# Patient Record
Sex: MALE | Race: WHITE | NOT HISPANIC OR LATINO | URBAN - METROPOLITAN AREA
[De-identification: names, ages, dates, MRNs, and addresses within clinical notes are randomized per-mention and may not be internally consistent; named-entity substitution may affect disease eponyms.]

---

## 2023-11-09 ENCOUNTER — HOSPITAL ENCOUNTER (EMERGENCY)
Facility: HOSPITAL | Age: 23
Discharge: HOME | End: 2023-11-09
Payer: COMMERCIAL

## 2023-11-09 VITALS
SYSTOLIC BLOOD PRESSURE: 131 MMHG | DIASTOLIC BLOOD PRESSURE: 68 MMHG | RESPIRATION RATE: 18 BRPM | WEIGHT: 196.43 LBS | HEART RATE: 87 BPM | HEIGHT: 71 IN | TEMPERATURE: 97.9 F | OXYGEN SATURATION: 98 % | BODY MASS INDEX: 27.5 KG/M2

## 2023-11-09 DIAGNOSIS — S61.012A LACERATION OF LEFT THUMB WITHOUT FOREIGN BODY WITHOUT DAMAGE TO NAIL, INITIAL ENCOUNTER: Primary | ICD-10-CM

## 2023-11-09 DIAGNOSIS — M79.645 PAIN OF FINGER OF LEFT HAND: ICD-10-CM

## 2023-11-09 PROCEDURE — 99285 EMERGENCY DEPT VISIT HI MDM: CPT

## 2023-11-09 PROCEDURE — 99282 EMERGENCY DEPT VISIT SF MDM: CPT

## 2023-11-09 ASSESSMENT — PAIN DESCRIPTION - DESCRIPTORS: DESCRIPTORS: SHARP

## 2023-11-09 ASSESSMENT — PAIN DESCRIPTION - ORIENTATION: ORIENTATION: LEFT

## 2023-11-09 ASSESSMENT — COLUMBIA-SUICIDE SEVERITY RATING SCALE - C-SSRS
2. HAVE YOU ACTUALLY HAD ANY THOUGHTS OF KILLING YOURSELF?: NO
6. HAVE YOU EVER DONE ANYTHING, STARTED TO DO ANYTHING, OR PREPARED TO DO ANYTHING TO END YOUR LIFE?: NO
1. IN THE PAST MONTH, HAVE YOU WISHED YOU WERE DEAD OR WISHED YOU COULD GO TO SLEEP AND NOT WAKE UP?: NO

## 2023-11-09 ASSESSMENT — PAIN DESCRIPTION - LOCATION: LOCATION: HAND

## 2023-11-09 ASSESSMENT — PAIN SCALES - GENERAL: PAINLEVEL_OUTOF10: 2

## 2023-11-09 ASSESSMENT — PAIN DESCRIPTION - ONSET: ONSET: GRADUAL

## 2023-11-09 ASSESSMENT — PAIN - FUNCTIONAL ASSESSMENT: PAIN_FUNCTIONAL_ASSESSMENT: 0-10

## 2023-11-09 ASSESSMENT — PAIN DESCRIPTION - FREQUENCY: FREQUENCY: CONSTANT/CONTINUOUS

## 2023-11-09 ASSESSMENT — PAIN DESCRIPTION - PAIN TYPE: TYPE: ACUTE PAIN

## 2023-11-09 ASSESSMENT — PAIN DESCRIPTION - PROGRESSION: CLINICAL_PROGRESSION: GRADUALLY WORSENING

## 2023-11-09 NOTE — ED NOTES
Patient's wound cleansed and wrapped with xeroform and pressure dressing. Instructions for wound care reviewed with patient and all questions answered to patient's satisfaction.     Siobhan Fernandez RN  11/09/23 5979

## 2023-11-09 NOTE — Clinical Note
Ludwin Vaughan was seen and treated in our emergency department on 11/9/2023.  He may return to work on 11/10/2023.  Must keep wound clean and dry      If you have any questions or concerns, please don't hesitate to call.      Veronica Boucher PA-C

## 2023-11-09 NOTE — ED PROVIDER NOTES
HPI   Chief Complaint   Patient presents with    Finger Laceration     Sliced off tip of finger with knife at work, bleeding controlled,        Patient presents emergency department with complaint of laceration to left thumb that occurred at work.  Patient works at [employer].  He was using a knife to cut an onion when he cut the distal aspect of his thumb.  Patient denies any other injury or complaints.  Last tetanus shot in 2016.       History provided by:  Patient   used: No                        No data recorded                Patient History   Past Medical History:   Diagnosis Date    Acute upper respiratory infection, unspecified 09/28/2020    Viral URI with cough     No past surgical history on file.  No family history on file.  Social History     Tobacco Use    Smoking status: Not on file    Smokeless tobacco: Not on file   Substance Use Topics    Alcohol use: Not on file    Drug use: Not on file       Physical Exam   ED Triage Vitals [11/09/23 1114]   Temp Heart Rate Resp BP   36.6 °C (97.9 °F) 87 18 131/68      SpO2 Temp Source Heart Rate Source Patient Position   98 % Oral -- Sitting      BP Location FiO2 (%)     Right arm --       Physical Exam  Vitals and nursing note reviewed.   Constitutional:       Appearance: Normal appearance. He is normal weight.   HENT:      Head: Normocephalic.   Pulmonary:      Effort: Pulmonary effort is normal.   Musculoskeletal:      Comments: Distal pad left thumb 1 cm avulsion of skin held by a .5cm flap.  Laceration at tip of nail.  No foreign body noted in wound.  Appropriate strength with flexion extension of thumb.   Skin:     General: Skin is warm.      Capillary Refill: Capillary refill takes less than 2 seconds.   Neurological:      Mental Status: He is alert and oriented to person, place, and time.         ED Course & MDM   Diagnoses as of 11/09/23 1118   Laceration of left thumb without foreign body without damage to nail, initial  encounter   Pain of finger of left hand       Medical Decision Making  Patient presents emergency department with complaint of having laceration to left thumb.  On my evaluation emergency department patient's laceration is in the distal aspect of the pad and nail.  Given location of laceration less likely patient is experiencing laceration to tendon.  Also due to location at tip of finger will hold on applying sutures as it would involve patients nail.  Patient's tetanus shot already up-to-date.  Patient will be given wound care here in emergency department and discharged home.  Patient did have a syncopal event after seeing laceration while at work however denies any chest pain or palpitation shortness of breath he does not have any headache or visual changes suspect vagal episode.        Procedure  Procedures            Veronica Boucher PA-C  11/09/23 1128

## 2023-11-09 NOTE — DISCHARGE INSTRUCTIONS
You laceration is a flap to close to nail to repair at this time.  Please keep clean dressing on your injury.  Please follow-up in urgent care in 10 days.  Be careful when removing your dressing please keep clean and covered for the 10 days until your follow-up

## 2024-09-20 ENCOUNTER — OFFICE VISIT (OUTPATIENT)
Dept: URGENT CARE | Age: 24
End: 2024-09-20
Payer: COMMERCIAL

## 2024-09-20 VITALS
DIASTOLIC BLOOD PRESSURE: 81 MMHG | TEMPERATURE: 98.7 F | HEART RATE: 81 BPM | BODY MASS INDEX: 25.94 KG/M2 | WEIGHT: 186 LBS | OXYGEN SATURATION: 95 % | RESPIRATION RATE: 18 BRPM | SYSTOLIC BLOOD PRESSURE: 127 MMHG

## 2024-09-20 DIAGNOSIS — M79.601 RIGHT ARM PAIN: Primary | ICD-10-CM

## 2024-09-20 NOTE — PROGRESS NOTES
Subjective   Patient ID: Ludwin Vaughan is a 24 y.o. male. They present today with a chief complaint of Injury (Right bicep pain/numbness x 2 days; noticed while weight lifting in the gym).    History of Present Illness  Patient reports for numbness in right bicep area for 2 days after working out, mild tenderness in that area at times. Denies injury.           Past Medical History  Allergies as of 09/20/2024    (No Known Allergies)       (Not in a hospital admission)       Past Medical History:   Diagnosis Date    Acute upper respiratory infection, unspecified 09/28/2020    Viral URI with cough       No past surgical history on file.         Review of Systems  Review of Systems   Musculoskeletal:         Mild numbness to right bicep and mild tenderness at times.     All other systems reviewed and are negative.                                 Objective    Vitals:    09/20/24 1437   BP: 127/81   BP Location: Left arm   Patient Position: Sitting   BP Cuff Size: Adult   Pulse: 81   Resp: 18   Temp: 37.1 °C (98.7 °F)   TempSrc: Oral   SpO2: 95%   Weight: 84.4 kg (186 lb)     No LMP for male patient.    Physical Exam  Vitals reviewed.   Constitutional:       Appearance: Normal appearance.   HENT:      Head: Normocephalic.   Cardiovascular:      Rate and Rhythm: Normal rate and regular rhythm.      Pulses: Normal pulses.      Heart sounds: Normal heart sounds.   Pulmonary:      Effort: Pulmonary effort is normal.      Breath sounds: Normal breath sounds.   Musculoskeletal:         General: Normal range of motion.      Cervical back: Normal range of motion.   Skin:     General: Skin is warm and dry.      Capillary Refill: Capillary refill takes less than 2 seconds.   Neurological:      General: No focal deficit present.      Mental Status: He is alert and oriented to person, place, and time.   Psychiatric:         Mood and Affect: Mood normal.         Behavior: Behavior normal.         Procedures    Point of Care Test &  Imaging Results from this visit  No results found for this visit on 09/20/24.   No results found.    Diagnostic study results (if any) were reviewed by RASHAD Smith.    Assessment/Plan   Allergies, medications, history, and pertinent labs/EKGs/Imaging reviewed by RASHAD Smith.     Medical Decision Making  Patient in NAD, Vss, HRR, lungs clear.  Reports after working out and his right bicep felt numb for a few days, reports it is getting better, mild tenderness.  No injury  On exam no obvious deformity, pulses palpable, skin is warm and dry.  No edema, no erythema, no ecchymosis.  Given ortho number and adress to follow up with today, go to ED with any worsening symptoms, patient agrees with plan of care.    Denies chest pain or dizziness, small spot numb on right bicep after working out.     Orders and Diagnoses  There are no diagnoses linked to this encounter.    Medical Admin Record      Patient disposition: Home    Electronically signed by RASHAD Smith  5:00 PM

## 2025-01-21 ENCOUNTER — APPOINTMENT (OUTPATIENT)
Dept: PRIMARY CARE | Facility: CLINIC | Age: 25
End: 2025-01-21
Payer: COMMERCIAL

## 2025-05-21 ENCOUNTER — APPOINTMENT (OUTPATIENT)
Dept: PRIMARY CARE | Facility: CLINIC | Age: 25
End: 2025-05-21
Payer: COMMERCIAL

## 2025-05-21 VITALS
HEART RATE: 82 BPM | OXYGEN SATURATION: 98 % | BODY MASS INDEX: 28.42 KG/M2 | DIASTOLIC BLOOD PRESSURE: 78 MMHG | TEMPERATURE: 98.5 F | SYSTOLIC BLOOD PRESSURE: 132 MMHG | HEIGHT: 71 IN | WEIGHT: 203 LBS

## 2025-05-21 DIAGNOSIS — F41.1 GAD (GENERALIZED ANXIETY DISORDER): Primary | ICD-10-CM

## 2025-05-21 PROCEDURE — 99203 OFFICE O/P NEW LOW 30 MIN: CPT | Performed by: STUDENT IN AN ORGANIZED HEALTH CARE EDUCATION/TRAINING PROGRAM

## 2025-05-21 PROCEDURE — 3008F BODY MASS INDEX DOCD: CPT | Performed by: STUDENT IN AN ORGANIZED HEALTH CARE EDUCATION/TRAINING PROGRAM

## 2025-05-21 PROCEDURE — G8433 SCR FOR DEP NOT CPT DOC RSN: HCPCS | Performed by: STUDENT IN AN ORGANIZED HEALTH CARE EDUCATION/TRAINING PROGRAM

## 2025-05-21 RX ORDER — HYDROXYZINE HYDROCHLORIDE 25 MG/1
25 TABLET, FILM COATED ORAL EVERY 8 HOURS PRN
Qty: 60 TABLET | Refills: 0 | Status: SHIPPED | OUTPATIENT
Start: 2025-05-21 | End: 2025-06-20

## 2025-05-21 RX ORDER — ESCITALOPRAM OXALATE 5 MG/1
5 TABLET ORAL DAILY
Qty: 30 TABLET | Refills: 2 | Status: SHIPPED | OUTPATIENT
Start: 2025-05-21 | End: 2025-08-19

## 2025-05-21 ASSESSMENT — PATIENT HEALTH QUESTIONNAIRE - PHQ9
4. FEELING TIRED OR HAVING LITTLE ENERGY: MORE THAN HALF THE DAYS
8. MOVING OR SPEAKING SO SLOWLY THAT OTHER PEOPLE COULD HAVE NOTICED. OR THE OPPOSITE, BEING SO FIGETY OR RESTLESS THAT YOU HAVE BEEN MOVING AROUND A LOT MORE THAN USUAL: SEVERAL DAYS
9. THOUGHTS THAT YOU WOULD BE BETTER OFF DEAD, OR OF HURTING YOURSELF: SEVERAL DAYS
2. FEELING DOWN, DEPRESSED OR HOPELESS: NEARLY EVERY DAY
10. IF YOU CHECKED OFF ANY PROBLEMS, HOW DIFFICULT HAVE THESE PROBLEMS MADE IT FOR YOU TO DO YOUR WORK, TAKE CARE OF THINGS AT HOME, OR GET ALONG WITH OTHER PEOPLE: EXTREMELY DIFFICULT
2. FEELING DOWN, DEPRESSED OR HOPELESS: NEARLY EVERY DAY
5. POOR APPETITE OR OVEREATING: NOT AT ALL
3. TROUBLE FALLING OR STAYING ASLEEP OR SLEEPING TOO MUCH: SEVERAL DAYS
7. TROUBLE CONCENTRATING ON THINGS, SUCH AS READING THE NEWSPAPER OR WATCHING TELEVISION: NEARLY EVERY DAY
1. LITTLE INTEREST OR PLEASURE IN DOING THINGS: NEARLY EVERY DAY
1. LITTLE INTEREST OR PLEASURE IN DOING THINGS: SEVERAL DAYS
SUM OF ALL RESPONSES TO PHQ9 QUESTIONS 1 AND 2: 4
6. FEELING BAD ABOUT YOURSELF - OR THAT YOU ARE A FAILURE OR HAVE LET YOURSELF OR YOUR FAMILY DOWN: MORE THAN HALF THE DAYS
SUM OF ALL RESPONSES TO PHQ9 QUESTIONS 1 AND 2: 6
SUM OF ALL RESPONSES TO PHQ QUESTIONS 1-9: 16

## 2025-05-21 ASSESSMENT — ENCOUNTER SYMPTOMS
RHINORRHEA: 0
WOUND: 0
DYSURIA: 0
LIGHT-HEADEDNESS: 0
MYALGIAS: 0
SHORTNESS OF BREATH: 0
WHEEZING: 0
VOMITING: 0
FREQUENCY: 0
FEVER: 0
PALPITATIONS: 0
CONSTIPATION: 0
DYSPHORIC MOOD: 1
CHILLS: 0
POLYDIPSIA: 0
SINUS PRESSURE: 0
NERVOUS/ANXIOUS: 1
DIZZINESS: 0
FATIGUE: 0
HEADACHES: 0
DIARRHEA: 0
ARTHRALGIAS: 0
COUGH: 0
NAUSEA: 0
SLEEP DISTURBANCE: 0
SINUS PAIN: 0

## 2025-05-21 ASSESSMENT — ANXIETY QUESTIONNAIRES
7. FEELING AFRAID AS IF SOMETHING AWFUL MIGHT HAPPEN: NEARLY EVERY DAY
5. BEING SO RESTLESS THAT IT IS HARD TO SIT STILL: NEARLY EVERY DAY
6. BECOMING EASILY ANNOYED OR IRRITABLE: NEARLY EVERY DAY
1. FEELING NERVOUS, ANXIOUS, OR ON EDGE: NEARLY EVERY DAY
GAD7 TOTAL SCORE: 21
IF YOU CHECKED OFF ANY PROBLEMS ON THIS QUESTIONNAIRE, HOW DIFFICULT HAVE THESE PROBLEMS MADE IT FOR YOU TO DO YOUR WORK, TAKE CARE OF THINGS AT HOME, OR GET ALONG WITH OTHER PEOPLE: EXTREMELY DIFFICULT
2. NOT BEING ABLE TO STOP OR CONTROL WORRYING: NEARLY EVERY DAY
3. WORRYING TOO MUCH ABOUT DIFFERENT THINGS: NEARLY EVERY DAY
4. TROUBLE RELAXING: NEARLY EVERY DAY

## 2025-05-21 ASSESSMENT — COLUMBIA-SUICIDE SEVERITY RATING SCALE - C-SSRS
4. HAVE YOU HAD THESE THOUGHTS AND HAD SOME INTENTION OF ACTING ON THEM?: NO
5. HAVE YOU STARTED TO WORK OUT OR WORKED OUT THE DETAILS OF HOW TO KILL YOURSELF? DO YOU INTEND TO CARRY OUT THIS PLAN?: NO
2. HAVE YOU ACTUALLY HAD ANY THOUGHTS OF KILLING YOURSELF?: YES
6. HAVE YOU EVER DONE ANYTHING, STARTED TO DO ANYTHING, OR PREPARED TO DO ANYTHING TO END YOUR LIFE?: NO
1. IN THE PAST MONTH, HAVE YOU WISHED YOU WERE DEAD OR WISHED YOU COULD GO TO SLEEP AND NOT WAKE UP?: YES

## 2025-05-21 NOTE — PROGRESS NOTES
"Subjective   Patient ID: Ludwin Vaughan is a 25 y.o. male who presents for New Patient Visit (Anxiety ).    Here today to establish care.  Has been having difficulties with anxiety.  Has difficulty in multiple settings.  Worries about many things a lot of the time.      Has not looked into any kind of treatment for this before.    Mom has anxiety.  Uncle had anxiety and depression, since .         Review of Systems   Constitutional:  Negative for chills, fatigue and fever.   HENT:  Negative for congestion, hearing loss, rhinorrhea, sinus pressure, sinus pain and tinnitus.    Eyes:  Negative for visual disturbance.   Respiratory:  Negative for cough, shortness of breath and wheezing.    Cardiovascular:  Negative for chest pain, palpitations and leg swelling.   Gastrointestinal:  Negative for constipation, diarrhea, nausea and vomiting.   Endocrine: Negative for cold intolerance, heat intolerance, polydipsia and polyuria.   Genitourinary:  Negative for dysuria, frequency and urgency.   Musculoskeletal:  Negative for arthralgias and myalgias.   Skin:  Negative for pallor, rash and wound.   Neurological:  Negative for dizziness, light-headedness and headaches.   Psychiatric/Behavioral:  Positive for dysphoric mood and suicidal ideas. Negative for sleep disturbance. The patient is nervous/anxious.        Objective     Visit Vitals  /78 (BP Location: Left arm, Patient Position: Sitting, BP Cuff Size: Adult)   Pulse 82   Temp 36.9 °C (98.5 °F) (Temporal)   Ht 1.791 m (5' 10.5\")   Wt 92.1 kg (203 lb)   SpO2 98%   BMI 28.72 kg/m²   Smoking Status Never   BSA 2.14 m²   KATHERIN-7 Total Score: 21 (2025  1:08 PM)  Patient Health Questionnaire-9 Score: 16        Physical Exam  Constitutional:       Appearance: Normal appearance.   HENT:      Head: Normocephalic and atraumatic.      Right Ear: Tympanic membrane, ear canal and external ear normal.      Left Ear: Tympanic membrane, ear canal and external ear " normal.      Nose: Nose normal.      Mouth/Throat:      Mouth: Mucous membranes are moist.      Pharynx: Oropharynx is clear.   Eyes:      Extraocular Movements: Extraocular movements intact.      Conjunctiva/sclera: Conjunctivae normal.      Pupils: Pupils are equal, round, and reactive to light.   Cardiovascular:      Rate and Rhythm: Normal rate and regular rhythm.      Pulses: Normal pulses.      Heart sounds: Normal heart sounds.   Pulmonary:      Effort: Pulmonary effort is normal.      Breath sounds: Normal breath sounds.   Abdominal:      General: There is no distension.      Palpations: Abdomen is soft.      Tenderness: There is no abdominal tenderness.   Musculoskeletal:         General: Normal range of motion.   Skin:     General: Skin is warm and dry.   Neurological:      Mental Status: He is alert and oriented to person, place, and time. Mental status is at baseline.   Psychiatric:         Mood and Affect: Mood normal.         Behavior: Behavior normal.         Assessment & Plan  KATHERIN (generalized anxiety disorder)    Orders:    escitalopram (Lexapro) 5 mg tablet; Take 1 tablet (5 mg) by mouth once daily.    hydrOXYzine HCL (Atarax) 25 mg tablet; Take 1 tablet (25 mg) by mouth every 8 hours if needed for anxiety.  discussed risks benefits and alternatives of starting pharmacotherapy for anxiety.  Patient does not have any personal history of juanpablo or psychosis.  He is amenable starting low-dose Lexapro at this time.  Discussed expected and uncommon side effects.  He is to follow-up in 4 to 6 weeks or sooner if needed.      Also provided hydroxyzine to use as needed.  Advised that this will cause drowsiness, so would recommend taking this away from work the first time he uses it to see how severe of an effect it has on him.     Reviewed and approved by IAN SMITH on 5/22/25 at 6:16 PM.

## 2025-05-21 NOTE — PATIENT INSTRUCTIONS
We are starting you on escitalopram, a medication which is typically used for depression or anxiety.  It is a fairly safe medication.  There are a few things to keep in mind when taking this type of medication:    Take it with food.  This medication can cause stomach upset if you take it on an empty stomach, so take with something to eat.   Try in the morning first.  This type of medication should not cause drowsiness, so you can take it in the morning.  For a small number of people, it make them tired.  If you find that you are tired or drowsy from taking this medication, try taking it in the evening instead.   Once you figure out what time to take this medication, try to take it at roughly the same time every day.     Things to be aware of:    Time to see results - this type of medication is fairly slow.  We usually say to give it at least 4 weeks to determine if it works for you or not  Side effects - Side effects are usually mild.  They tend to include a small amount of weight gain (think 3-5 lbs), stomach upset, as well as decreased libido (sex drive).  If you have any severe or persistent side effects, there are other medication options we can try you on.